# Patient Record
Sex: MALE | Race: WHITE | NOT HISPANIC OR LATINO | URBAN - METROPOLITAN AREA
[De-identification: names, ages, dates, MRNs, and addresses within clinical notes are randomized per-mention and may not be internally consistent; named-entity substitution may affect disease eponyms.]

---

## 2019-05-10 ENCOUNTER — IMPORTED ENCOUNTER (OUTPATIENT)
Dept: URBAN - METROPOLITAN AREA CLINIC 50 | Facility: CLINIC | Age: 24
End: 2019-05-10

## 2019-05-17 NOTE — PATIENT DISCUSSION
Calling regarding: Karolina from [facility] requested a call back re obtaining a verbal order for a tub transfer bench.        Caller: Karolina    Timeframe for callback: Today     Phone number to be reached at: [phone]       No retinal tears or retinal detachment seen on clinical exam today. Reviewed the signs and symptoms of retinal tear/retinal detachment and the importance of calling for prompt evaluation should there be increasing floaters, new flashing lights, or decreasing peripheral vision in either eye at any time. Observation recommended.

## 2019-06-07 NOTE — PATIENT DISCUSSION
Recommend PNEUMATIC RETINOPEXY (GAS FLUID EXCHANGE)  followed by LASER on follow up when retina is attached to try and reduce the risk of redetachment. Discussed benefits, alternatives, and risks of surgery including (but not limited to) cataract (if patient has natural lens), glaucoma, infection, bleeding, redetachment, optic neuropathy, loss of vision, blindness, and loss of eye. Patient understands more than one operation may be needed to repair retinal detachment.

## 2019-06-07 NOTE — PROCEDURE NOTE: CLINICAL
PROCEDURE NOTE: Pneumatic Retinopexy #1 OS. Diagnosis: Retinal Detachment with Single Break. Anesthesia: Subconjunctival. Prep: Betadine Flush. Prior to procedure, risks/benefits/alternatives discussed including infection, loss of vision, hemorrhage, cataract, glaucoma, retinal tears or detachment and patient wished to proceed. The patient was brought to the treatment room where local anesthesia was achieved by a 0.5 CC and was given in a [address] fashion. After adequate anesthesia, the areas about the eye with the retinal detachment were prepped and draped in the usual sterile fashion. A lid speculum was placed into the operative and eye and removed prior to the end of the procedure. A drop of topical 5% iodine solution was applied to the globe. Cryo retinopexy WAS NOT* performed at the identified holes or tears. The patient was placed on their side and an area *mm posterior to the corneoscleral limbus was marked in the * quadrant. A *cc injection of * was made under direct visualization into the vitreous cavity. After the injection, the patient was rotated to their opposite side, and the needle was withdrawn from the vitreous cavity. Indirect ophthalmoscopy revealed the central retinal artery to be hypoperfused indicating acute glaucoma/pressure elevation. A therapeutic paracentesis was performed and * of fluid was removed. The intraocular pressure following paracentesis was *. The intraocular pressure 10 minutes after the procedure was *mmHg. (The patient was given Diamox 500 mg, Alphagan, Xalatan, Azopt, Lumigan, Cosopt, Combigan, Cyclogyl, atropine, Timoptic-XE ½% one drop). Erythromycin ointment was placed into the eye and a sterile patch was placed over the eye. The patient tolerated the procedure well and left the treatment room in stable condition. There were no complications. Post-op instructions given. The patient was then discharged home on Zymar/Zymaxid/Besivance, q.i.d. and Pred Forte q.i.d. Patient given office phone number/answering service number and advised to call immediately should there be an increase in floaters or redness, loss of vision or pain, or should they have any other questions or concerns. [facility]

## 2019-06-10 NOTE — PROCEDURE NOTE: CLINICAL
PROCEDURE NOTE: Laser for RD OS. Diagnosis: Retinal Detachment with Single Break. Anesthesia: Topical. Prior to laser, risks/benefits/alternatives to laser discussed including loss of vision, decreased peripheral and night vision, need for more laser and/or surgery and patient wished to proceed. A written consent is on file, and the need for today’s laser was discussed and the patient is understanding and wishes to proceed. Spot size: 200* um. Pulse power: 100* mW. Pulse duration: 100* ms. Number 267*. Procedure Time: 950AM*. Patient tolerated procedure well. There were no complications. Post-op instructions given. Patient given office phone number/answering service number and advised to call immediately should there be loss of vision or pain, or should they have any other questions or concerns. Marisabel Dorman

## 2019-06-13 NOTE — PROCEDURE NOTE: CLINICAL
PROCEDURE NOTE: Laser for RD OS. Diagnosis: Retinal Detachment with Single Break. Anesthesia: Topical. Prior to laser, risks/benefits/alternatives to laser discussed including loss of vision, decreased peripheral and night vision, need for more laser and/or surgery and patient wished to proceed. A written consent is on file, and the need for today’s laser was discussed and the patient is understanding and wishes to proceed. Spot size: 200 um. Pulse power: 260 mW. Pulse duration: 80 ms. Number 260. Procedure Time: 10:20 AM. Patient tolerated procedure well. There were no complications. Post-op instructions given. Patient given office phone number/answering service number and advised to call immediately should there be loss of vision or pain, or should they have any other questions or concerns. Virginia Seo

## 2019-11-11 ENCOUNTER — IMPORTED ENCOUNTER (OUTPATIENT)
Dept: URBAN - METROPOLITAN AREA CLINIC 50 | Facility: CLINIC | Age: 24
End: 2019-11-11

## 2019-11-18 ENCOUNTER — IMPORTED ENCOUNTER (OUTPATIENT)
Dept: URBAN - METROPOLITAN AREA CLINIC 50 | Facility: CLINIC | Age: 24
End: 2019-11-18

## 2021-04-18 ASSESSMENT — VISUAL ACUITY
OS_CC: J1+@ 16 IN
OS_CC: 20/20-1
OS_CC: 20/15-1
OD_CC: 20/20
OD_CC: 20/20-1
OD_CC: J1+@ 16 IN

## 2021-04-18 ASSESSMENT — TONOMETRY
OS_IOP_MMHG: 12
OD_IOP_MMHG: 12

## 2021-10-12 ENCOUNTER — PREPPED CHART (OUTPATIENT)
Dept: URBAN - METROPOLITAN AREA CLINIC 50 | Facility: CLINIC | Age: 26
End: 2021-10-12

## 2021-10-14 ENCOUNTER — ANNUAL COMPREHENSIVE EXAM (OUTPATIENT)
Dept: URBAN - METROPOLITAN AREA CLINIC 50 | Facility: CLINIC | Age: 26
End: 2021-10-14

## 2021-10-14 DIAGNOSIS — H52.13: ICD-10-CM

## 2021-10-14 DIAGNOSIS — Z97.3: ICD-10-CM

## 2021-10-14 PROCEDURE — 92014 COMPRE OPH EXAM EST PT 1/>: CPT

## 2021-10-14 PROCEDURE — 92015NC REFRACTION NO CHARGE

## 2021-10-14 PROCEDURE — NCCON CL FIT/CHECK, NO CHARGE

## 2021-10-14 ASSESSMENT — TONOMETRY
OS_IOP_MMHG: 13
OD_IOP_MMHG: 12

## 2021-10-14 ASSESSMENT — VISUAL ACUITY
OS_CC: 20/25-1
OU_CC: J1+